# Patient Record
Sex: MALE | Race: WHITE | NOT HISPANIC OR LATINO | Employment: UNEMPLOYED | URBAN - METROPOLITAN AREA
[De-identification: names, ages, dates, MRNs, and addresses within clinical notes are randomized per-mention and may not be internally consistent; named-entity substitution may affect disease eponyms.]

---

## 2023-02-26 ENCOUNTER — HOSPITAL ENCOUNTER (EMERGENCY)
Facility: HOSPITAL | Age: 58
Discharge: PSYCHIATRIC OR SUBSTANCE USE FACILITY | End: 2023-02-26
Attending: EMERGENCY MEDICINE
Payer: MEDICARE

## 2023-02-26 VITALS
SYSTOLIC BLOOD PRESSURE: 148 MMHG | HEIGHT: 70 IN | BODY MASS INDEX: 25.22 KG/M2 | DIASTOLIC BLOOD PRESSURE: 88 MMHG | TEMPERATURE: 98 F | OXYGEN SATURATION: 98 % | RESPIRATION RATE: 17 BRPM | WEIGHT: 176.19 LBS | HEART RATE: 89 BPM

## 2023-02-26 DIAGNOSIS — S82.044G: ICD-10-CM

## 2023-02-26 DIAGNOSIS — F10.10 ETOH ABUSE: ICD-10-CM

## 2023-02-26 DIAGNOSIS — R45.851 DEPRESSION WITH SUICIDAL IDEATION: Primary | ICD-10-CM

## 2023-02-26 DIAGNOSIS — Z00.8 MEDICAL CLEARANCE FOR PSYCHIATRIC ADMISSION: ICD-10-CM

## 2023-02-26 DIAGNOSIS — F32.A DEPRESSION WITH SUICIDAL IDEATION: Primary | ICD-10-CM

## 2023-02-26 DIAGNOSIS — M25.561 RIGHT KNEE PAIN: ICD-10-CM

## 2023-02-26 LAB
ALBUMIN SERPL BCP-MCNC: 4 G/DL (ref 3.5–5.2)
ALP SERPL-CCNC: 118 U/L (ref 55–135)
ALT SERPL W/O P-5'-P-CCNC: 120 U/L (ref 10–44)
AMPHET+METHAMPHET UR QL: NEGATIVE
ANION GAP SERPL CALC-SCNC: 18 MMOL/L (ref 8–16)
AST SERPL-CCNC: 173 U/L (ref 10–40)
BACTERIA #/AREA URNS HPF: ABNORMAL /HPF
BARBITURATES UR QL SCN>200 NG/ML: NEGATIVE
BASOPHILS # BLD AUTO: 0.05 K/UL (ref 0–0.2)
BASOPHILS NFR BLD: 0.7 % (ref 0–1.9)
BENZODIAZ UR QL SCN>200 NG/ML: NEGATIVE
BILIRUB SERPL-MCNC: 0.9 MG/DL (ref 0.1–1)
BILIRUB UR QL STRIP: NEGATIVE
BUN SERPL-MCNC: 12 MG/DL (ref 6–20)
BZE UR QL SCN: ABNORMAL
CALCIUM SERPL-MCNC: 10.1 MG/DL (ref 8.7–10.5)
CANNABINOIDS UR QL SCN: NEGATIVE
CHLORIDE SERPL-SCNC: 102 MMOL/L (ref 95–110)
CLARITY UR: CLEAR
CO2 SERPL-SCNC: 21 MMOL/L (ref 23–29)
COLOR UR: YELLOW
CREAT SERPL-MCNC: 0.7 MG/DL (ref 0.5–1.4)
CREAT UR-MCNC: 66.8 MG/DL (ref 23–375)
DIFFERENTIAL METHOD: ABNORMAL
EOSINOPHIL # BLD AUTO: 0 K/UL (ref 0–0.5)
EOSINOPHIL NFR BLD: 0.3 % (ref 0–8)
ERYTHROCYTE [DISTWIDTH] IN BLOOD BY AUTOMATED COUNT: 14.6 % (ref 11.5–14.5)
EST. GFR  (NO RACE VARIABLE): >60 ML/MIN/1.73 M^2
ETHANOL SERPL-MCNC: 107 MG/DL
ETHANOL SERPL-MCNC: 202 MG/DL
ETHANOL SERPL-MCNC: 73 MG/DL
GLUCOSE SERPL-MCNC: 95 MG/DL (ref 70–110)
GLUCOSE UR QL STRIP: NEGATIVE
HCT VFR BLD AUTO: 41.2 % (ref 40–54)
HGB BLD-MCNC: 13.9 G/DL (ref 14–18)
HGB UR QL STRIP: ABNORMAL
IMM GRANULOCYTES # BLD AUTO: 0.01 K/UL (ref 0–0.04)
IMM GRANULOCYTES NFR BLD AUTO: 0.1 % (ref 0–0.5)
KETONES UR QL STRIP: ABNORMAL
LEUKOCYTE ESTERASE UR QL STRIP: NEGATIVE
LYMPHOCYTES # BLD AUTO: 2.6 K/UL (ref 1–4.8)
LYMPHOCYTES NFR BLD: 36.5 % (ref 18–48)
MCH RBC QN AUTO: 33.4 PG (ref 27–31)
MCHC RBC AUTO-ENTMCNC: 33.7 G/DL (ref 32–36)
MCV RBC AUTO: 99 FL (ref 82–98)
METHADONE UR QL SCN>300 NG/ML: NEGATIVE
MICROSCOPIC COMMENT: ABNORMAL
MONOCYTES # BLD AUTO: 0.5 K/UL (ref 0.3–1)
MONOCYTES NFR BLD: 7.5 % (ref 4–15)
NEUTROPHILS # BLD AUTO: 4 K/UL (ref 1.8–7.7)
NEUTROPHILS NFR BLD: 54.9 % (ref 38–73)
NITRITE UR QL STRIP: NEGATIVE
NRBC BLD-RTO: 0 /100 WBC
OPIATES UR QL SCN: NEGATIVE
PCP UR QL SCN>25 NG/ML: NEGATIVE
PH UR STRIP: 6 [PH] (ref 5–8)
PLATELET # BLD AUTO: 238 K/UL (ref 150–450)
PMV BLD AUTO: 9.1 FL (ref 9.2–12.9)
POTASSIUM SERPL-SCNC: 3.6 MMOL/L (ref 3.5–5.1)
PROT SERPL-MCNC: 7.7 G/DL (ref 6–8.4)
PROT UR QL STRIP: ABNORMAL
RBC # BLD AUTO: 4.16 M/UL (ref 4.6–6.2)
RBC #/AREA URNS HPF: 23 /HPF (ref 0–4)
SARS-COV-2 RDRP RESP QL NAA+PROBE: NEGATIVE
SODIUM SERPL-SCNC: 141 MMOL/L (ref 136–145)
SP GR UR STRIP: 1.01 (ref 1–1.03)
TOXICOLOGY INFORMATION: ABNORMAL
TSH SERPL DL<=0.005 MIU/L-ACNC: 0.6 UIU/ML (ref 0.4–4)
URN SPEC COLLECT METH UR: ABNORMAL
UROBILINOGEN UR STRIP-ACNC: NEGATIVE EU/DL
WBC # BLD AUTO: 7.2 K/UL (ref 3.9–12.7)

## 2023-02-26 PROCEDURE — 82077 ASSAY SPEC XCP UR&BREATH IA: CPT | Performed by: EMERGENCY MEDICINE

## 2023-02-26 PROCEDURE — 81000 URINALYSIS NONAUTO W/SCOPE: CPT | Mod: 59 | Performed by: EMERGENCY MEDICINE

## 2023-02-26 PROCEDURE — 84443 ASSAY THYROID STIM HORMONE: CPT | Performed by: EMERGENCY MEDICINE

## 2023-02-26 PROCEDURE — U0002 COVID-19 LAB TEST NON-CDC: HCPCS | Performed by: EMERGENCY MEDICINE

## 2023-02-26 PROCEDURE — G0425 INPT/ED TELECONSULT30: HCPCS | Mod: 95,,, | Performed by: PSYCHIATRY & NEUROLOGY

## 2023-02-26 PROCEDURE — 80307 DRUG TEST PRSMV CHEM ANLYZR: CPT | Performed by: EMERGENCY MEDICINE

## 2023-02-26 PROCEDURE — 25000003 PHARM REV CODE 250: Performed by: EMERGENCY MEDICINE

## 2023-02-26 PROCEDURE — 80053 COMPREHEN METABOLIC PANEL: CPT | Performed by: EMERGENCY MEDICINE

## 2023-02-26 PROCEDURE — G0425 PR INPT TELEHEALTH CONSULT 30M: ICD-10-PCS | Mod: 95,,, | Performed by: PSYCHIATRY & NEUROLOGY

## 2023-02-26 PROCEDURE — 85025 COMPLETE CBC W/AUTO DIFF WBC: CPT | Performed by: EMERGENCY MEDICINE

## 2023-02-26 PROCEDURE — 99285 EMERGENCY DEPT VISIT HI MDM: CPT

## 2023-02-26 RX ORDER — SUCRALFATE 1 G/10ML
1 SUSPENSION ORAL
Status: COMPLETED | OUTPATIENT
Start: 2023-02-26 | End: 2023-02-26

## 2023-02-26 RX ORDER — SUCRALFATE 1 G/10ML
1 SUSPENSION ORAL
Status: DISCONTINUED | OUTPATIENT
Start: 2023-02-26 | End: 2023-02-27 | Stop reason: HOSPADM

## 2023-02-26 RX ORDER — LORAZEPAM 1 MG/1
1 TABLET ORAL
Status: COMPLETED | OUTPATIENT
Start: 2023-02-26 | End: 2023-02-26

## 2023-02-26 RX ORDER — LORAZEPAM 1 MG/1
1 TABLET ORAL EVERY 4 HOURS PRN
Status: DISCONTINUED | OUTPATIENT
Start: 2023-02-26 | End: 2023-02-27 | Stop reason: HOSPADM

## 2023-02-26 RX ORDER — THIAMINE HCL 100 MG
100 TABLET ORAL DAILY
Status: DISCONTINUED | OUTPATIENT
Start: 2023-02-26 | End: 2023-02-27 | Stop reason: HOSPADM

## 2023-02-26 RX ADMIN — LORAZEPAM 1 MG: 1 TABLET ORAL at 07:02

## 2023-02-26 RX ADMIN — LORAZEPAM 1 MG: 1 TABLET ORAL at 01:02

## 2023-02-26 RX ADMIN — THIAMINE HCL TAB 100 MG 100 MG: 100 TAB at 04:02

## 2023-02-26 RX ADMIN — SUCRALFATE 1 G: 1 SUSPENSION ORAL at 08:02

## 2023-02-26 RX ADMIN — SUCRALFATE 1 G: 1 SUSPENSION ORAL at 01:02

## 2023-02-26 NOTE — ED NOTES
Patient Belongings  1.) Shoes  2.) Shirt  3.) Pants  4.) Hat  5.) Knee Brace  6.) Sweat shirt  7.) Lighter  8.) Grizzly   9.) Wallet  10.) Coins   Patient belongings have been searched and locked in locker #28

## 2023-02-26 NOTE — ED PROVIDER NOTES
SCRIBE #1 NOTE: I, Ramana Celaya and London Dwyer, am scribing for, and in the presence of, Angelina Mcneil MD. I have scribed the entire note.       History     Chief Complaint   Patient presents with    Suicidal     Patient present to ER stating he wants to kill himself. EMS was called due to patient attempting to cut his wrist outside of a store.      Review of patient's allergies indicates:  No Known Allergies      History of Present Illness     HPI    2/26/2023, 11:11 AM  History obtained from the patient and EMS      History of Present Illness: Michael Hernandez is a 57 y.o. male patient with a PMHx of EtOH abuse and mental illness who presents to the Emergency Department for psychiatric evaluation which onset PTA. Per EMS, the pt was found outside of a convenience store cutting wrist; 911 was called. Pt is actively suicidal, saying he does not want to live anymore. Symptoms are constant and moderate in severity. No mitigating or exacerbating factors reported. No additional associated sxs. Patient denies any SOB, CP, abd pain, weakness, and all other sxs at this time. No Prior Tx. Pt's last drink of alcohol was this am. Pt has a hx of withdrawals and DT. Pt injured right patella a few weeks ago, was seen at [facility] and diagnosed with patella fx, arrived with knee immobilizer in place. No further complaints or concerns at this time.       Arrival mode: EMS    PCP: No primary care provider on file.        Past Medical History:  No past medical history on file.    Past Surgical History:  No past surgical history on file.      Family History:  No family history on file.    Social History:  Social History     Tobacco Use    Smoking status: Not on file    Smokeless tobacco: Not on file   Substance and Sexual Activity    Alcohol use: Not on file    Drug use: Not on file    Sexual activity: Not on file        Review of Systems     Review of Systems   Constitutional:  Negative for chills and fever.    HENT:  Negative for congestion and sore throat.    Respiratory:  Negative for cough and shortness of breath.    Cardiovascular:  Negative for chest pain.   Gastrointestinal:  Negative for abdominal pain, constipation, nausea and vomiting.   Genitourinary:  Negative for dysuria.   Musculoskeletal:  Negative for back pain.   Skin:  Negative for rash.   Neurological:  Negative for weakness.   Hematological:  Does not bruise/bleed easily.   Psychiatric/Behavioral:  Positive for self-injury and suicidal ideas.    All other systems reviewed and are negative.     Physical Exam     Initial Vitals [02/26/23 1103]   BP Pulse Resp Temp SpO2   (!) 154/94 91 15 97.8 °F (36.6 °C) 98 %      MAP       --          Physical Exam  Nursing Notes and Vital Signs Reviewed.  Constitutional: Patient is in no acute distress. Well-developed and well-nourished.  Head: Atraumatic. Normocephalic.  Eyes: PERRL. EOM intact. Conjunctivae are not pale. No scleral icterus.  ENT: Mucous membranes are moist. Oropharynx is clear and symmetric.    Neck: Supple. Full ROM. No lymphadenopathy.  Cardiovascular: Regular rate. Regular rhythm. No murmurs, rubs, or gallops. Distal pulses are 2+ and symmetric.  Pulmonary/Chest: No respiratory distress. Clear to auscultation bilaterally. No wheezing or rales.  Abdominal: Soft and non-distended.  There is no tenderness.  No rebound, guarding, or rigidity. Good bowel sounds.  Genitourinary: No CVA tenderness  Musculoskeletal: Moves all extremities. No edema. Wearing knee immobilizer. Amputated fingers on L hand; 2nd and 3rd.   Skin: Warm and dry. Superficial cut marks to vulvar aspect of L wrist.  Neurological:  Alert, awake, and appropriate.  Normal speech.  No acute focal neurological deficits are appreciated.  Psychiatric: Appears depressed. Flat affect. Poor eye contact. Appropriate in content. +SI and self-injury.      ED Course   Procedures  ED Vital Signs:  Vitals:    02/26/23 1103   BP: (!) 154/94    Pulse: 91   Resp: 15   Temp: 97.8 °F (36.6 °C)   TempSrc: Oral   SpO2: 98%       Abnormal Lab Results:  Labs Reviewed   CBC W/ AUTO DIFFERENTIAL - Abnormal; Notable for the following components:       Result Value    RBC 4.16 (*)     Hemoglobin 13.9 (*)     MCV 99 (*)     MCH 33.4 (*)     RDW 14.6 (*)     MPV 9.1 (*)     All other components within normal limits   COMPREHENSIVE METABOLIC PANEL - Abnormal; Notable for the following components:    CO2 21 (*)      (*)      (*)     Anion Gap 18 (*)     All other components within normal limits   URINALYSIS, REFLEX TO URINE CULTURE - Abnormal; Notable for the following components:    Protein, UA Trace (*)     Ketones, UA 1+ (*)     Occult Blood UA 1+ (*)     All other components within normal limits    Narrative:     Specimen Source->Urine   DRUG SCREEN PANEL, URINE EMERGENCY - Abnormal; Notable for the following components:    Cocaine (Metab.) Presumptive Positive (*)     All other components within normal limits    Narrative:     Specimen Source->Urine   ALCOHOL,MEDICAL (ETHANOL) - Abnormal; Notable for the following components:    Alcohol, Serum 202 (*)     All other components within normal limits   URINALYSIS MICROSCOPIC - Abnormal; Notable for the following components:    RBC, UA 23 (*)     All other components within normal limits    Narrative:     Specimen Source->Urine   ALCOHOL,MEDICAL (ETHANOL) - Abnormal; Notable for the following components:    Alcohol, Serum 107 (*)     All other components within normal limits   TSH   SARS-COV-2 RNA AMPLIFICATION, QUAL   ALCOHOL,MEDICAL (ETHANOL)        All Lab Results:  Results for orders placed or performed during the hospital encounter of 02/26/23   CBC auto differential   Result Value Ref Range    WBC 7.20 3.90 - 12.70 K/uL    RBC 4.16 (L) 4.60 - 6.20 M/uL    Hemoglobin 13.9 (L) 14.0 - 18.0 g/dL    Hematocrit 41.2 40.0 - 54.0 %    MCV 99 (H) 82 - 98 fL    MCH 33.4 (H) 27.0 - 31.0 pg    MCHC 33.7 32.0 -  36.0 g/dL    RDW 14.6 (H) 11.5 - 14.5 %    Platelets 238 150 - 450 K/uL    MPV 9.1 (L) 9.2 - 12.9 fL    Immature Granulocytes 0.1 0.0 - 0.5 %    Gran # (ANC) 4.0 1.8 - 7.7 K/uL    Immature Grans (Abs) 0.01 0.00 - 0.04 K/uL    Lymph # 2.6 1.0 - 4.8 K/uL    Mono # 0.5 0.3 - 1.0 K/uL    Eos # 0.0 0.0 - 0.5 K/uL    Baso # 0.05 0.00 - 0.20 K/uL    nRBC 0 0 /100 WBC    Gran % 54.9 38.0 - 73.0 %    Lymph % 36.5 18.0 - 48.0 %    Mono % 7.5 4.0 - 15.0 %    Eosinophil % 0.3 0.0 - 8.0 %    Basophil % 0.7 0.0 - 1.9 %    Differential Method Automated    Comprehensive metabolic panel   Result Value Ref Range    Sodium 141 136 - 145 mmol/L    Potassium 3.6 3.5 - 5.1 mmol/L    Chloride 102 95 - 110 mmol/L    CO2 21 (L) 23 - 29 mmol/L    Glucose 95 70 - 110 mg/dL    BUN 12 6 - 20 mg/dL    Creatinine 0.7 0.5 - 1.4 mg/dL    Calcium 10.1 8.7 - 10.5 mg/dL    Total Protein 7.7 6.0 - 8.4 g/dL    Albumin 4.0 3.5 - 5.2 g/dL    Total Bilirubin 0.9 0.1 - 1.0 mg/dL    Alkaline Phosphatase 118 55 - 135 U/L     (H) 10 - 40 U/L     (H) 10 - 44 U/L    Anion Gap 18 (H) 8 - 16 mmol/L    eGFR >60 >60 mL/min/1.73 m^2   TSH   Result Value Ref Range    TSH 0.600 0.400 - 4.000 uIU/mL   Urinalysis, Reflex to Urine Culture Urine, Clean Catch    Specimen: Urine   Result Value Ref Range    Specimen UA Urine, Clean Catch     Color, UA Yellow Yellow, Straw, Zee    Appearance, UA Clear Clear    pH, UA 6.0 5.0 - 8.0    Specific Gravity, UA 1.010 1.005 - 1.030    Protein, UA Trace (A) Negative    Glucose, UA Negative Negative    Ketones, UA 1+ (A) Negative    Bilirubin (UA) Negative Negative    Occult Blood UA 1+ (A) Negative    Nitrite, UA Negative Negative    Urobilinogen, UA Negative <2.0 EU/dL    Leukocytes, UA Negative Negative   Drug screen panel, emergency   Result Value Ref Range    Benzodiazepines Negative Negative    Methadone metabolites Negative Negative    Cocaine (Metab.) Presumptive Positive (A) Negative    Opiate Scrn, Ur Negative  Negative    Barbiturate Screen, Ur Negative Negative    Amphetamine Screen, Ur Negative Negative    THC Negative Negative    Phencyclidine Negative Negative    Creatinine, Urine 66.8 23.0 - 375.0 mg/dL    Toxicology Information SEE COMMENT    Ethanol   Result Value Ref Range    Alcohol, Serum 202 (H) <10 mg/dL   COVID-19 Rapid Screening   Result Value Ref Range    SARS-CoV-2 RNA, Amplification, Qual Negative Negative   Urinalysis Microscopic   Result Value Ref Range    RBC, UA 23 (H) 0 - 4 /hpf    Bacteria Rare None-Occ /hpf    Microscopic Comment SEE COMMENT    Ethanol   Result Value Ref Range    Alcohol, Serum 107 (H) <10 mg/dL        Imaging Results:  Imaging Results              X-Ray Knee Complete 4 Or More Views Right (Final result)  Result time 02/26/23 11:31:44      Final result by YOBANI Martinez Sr., MD (02/26/23 11:31:44)                   Impression:      1. There are complex fractures of the patella.  2. There is a moderate sized joint effusion in the knee.      Electronically signed by: Vasiliy Martinez MD  Date:    02/26/2023  Time:    11:31               Narrative:    EXAMINATION:  XR KNEE COMP 4 OR MORE VIEWS RIGHT    CLINICAL HISTORY:  Pain in right knee    COMPARISON:  None    FINDINGS:  There are complex fractures of the patella.  There is a moderate sized joint effusion in the knee.  There is no dislocation.                                                The Emergency Provider reviewed the vital signs and test results, which are outlined above.     ED Discussion     11:17 AM: The PEC hold has been issued by Dr. Mcneil at this time for 11:17.     5:38 PM: Pt has been medically cleared by Dr. Mcneil at this time. Reassessed pt at this time. Pt is resting comfortably and appears in no acute distress. There are no psychiatric services offered at this facility. D/w pt all pertinent ED information and plan to transfer to psychiatric facility for psychiatric treatment. Pt verbalizes understanding.  [facility]. Pt has been made aware of all risks and benefits associated with transfer, including but not limited to death, MVC, loss of vital signs, and/or permanent disability. Benefits include ability to be treated at an inpatient psychiatric facility. Pt will be transported by personnel trained in CPR and CPI. Patient understands that there could be unforeseen motor vehicle accidents, inclement weather, or loss of vital signs that could result in potential death or permanent disability. All questions and complaints have been addressed at this time. Pt condition is stable at this time and is clear to transfer to psychiatric facility at this time.         Medical Decision Making:   Clinical Tests:   Lab Tests: Ordered and Reviewed  Radiological Study: Ordered and Reviewed  ED Management:  Patient presents with severe exacerbation of chronic depression, actively suicidal, not on medication, hx of etoh abuse with last drink today, lab work reviewed, patient also cocaine positive, ETOH initially over 200 and now 107, xray reviewed and shows comminuted patella fx patient to keep knee immobilizer in place, he is medically cleared now for inpatient psych placement.           ED Medication(s):  Medications   thiamine tablet 100 mg (100 mg Oral Given 2/26/23 1632)   sucralfate 100 mg/mL suspension 1 g (1 g Oral Given 2/26/23 1307)   LORazepam tablet 1 mg (1 mg Oral Given 2/26/23 1306)       New Prescriptions    No medications on file               Scribe Attestation:   Scribe #1: I performed the above scribed service and the documentation accurately describes the services I performed. I attest to the accuracy of the note.     Attending:   Physician Attestation Statement for Scribe #1: I, Angelina Mcneil MD, personally performed the services described in this documentation, as scribed by Ramana Celaya and London Dwyer, in my presence, and it is both  accurate and complete.           Clinical Impression       ICD-10-CM ICD-9-CM   1. Depression with suicidal ideation  F32.A 311    R45.851 V62.84   2. Right knee pain  M25.561 719.46   3. Medical clearance for psychiatric admission  Z00.8 V70.8   4. ETOH abuse  F10.10 305.00   5. Closed nondisplaced comminuted fracture of right patella with delayed healing, subsequent encounter  S82.044G V54.16       Disposition:   Disposition: Transferred  Condition: Fair      Angelina Mcneil MD  02/26/23 8250

## 2023-02-26 NOTE — CONSULTS
"[facility]  Psychiatry  Telepsychiatry Consult Note    Please see previous notes:    Patient agreeable to consultation via telepsychiatry.    Tele-Consultation from Psychiatry started: 2/26/2023 at 2:02pm  The chief complaint leading to psychiatric consultation is: SI, etoh use  This consultation was requested by Dr Mcneil, the Emergency Department attending physician.  The location of the consulting psychiatrist is  [place] .  The patient location is  [facility] EMERGENCY DEPARTMENT   The patient arrived at the ED at: [facility]    Also present with the patient at the time of the consultation: nobody    Patient Identification:   Michael Hernandez is a 57 y.o. male.    Patient information was obtained from patient and past medical records.  Patient presented to the Emergency Department     Consults  Teleconsult Time Documentation  Subjective:     History of Present Illness:  56yo male with hx of depression and alcohol use disorder brought in after cutting his wrist. BAL was 202. UDS was positive for cicaine    Per ED staff-"                History of Present Illness: Michael Hernandez is a 57 y.o. male patient with a PMHx of EtOH abuse and mental illness who presents to the Emergency Department for psychiatric evaluation which onset PTA. Per EMS, the pt was found outside of a convenience store cutting wrist; 911 was called. Pt is actively suicidal, saying he does not want to live anymore. Symptoms are constant and moderate in severity. No mitigating or exacerbating factors reported. No additional associated sxs. Patient denies any SOB, CP, abd pain, weakness, and all other sxs at this time. No Prior Tx. Pt's last drink of alcohol was this am. Pt has a hx of withdrawals and DT. Pt injured patella a few weeks ago. No further complaints or concerns at this time. "    On interview, patient was difficult to understand, speech was garbled. Patient reports he has been off his pristiq and seroquel for depression for the past few " "weeks. Reports he could not find a psychiatrist that his insurance would pay for. Patient reports he cut his wrist with a beer can because he could not find anything sharper.  Reports a woman inside the convenience store called EMS. He reports ongoing depressed mood, anhedonia, trouble sleeping, feeling down on himself, SI. Reports he relapsed 2 weeks ago. Also hears voices that call his name.  Denied manic sx  Increased free floating anxiety  No reexperiencing sx  No aggression or agitation noted in the ED  No obsession noted  This is the extent of patients complaints at this time  12 pt ros was negative aside from sx noted above    Psychiatric History:   Previous Psychiatric Hospitalizations: Yes , most recent September 1st  Previous Medication Trials: Yes   Previous Suicide Attempts: yes , cutting, drank a bottle of antifreeze  History of Violence: denied  History of Depression: yes  History of Rae: no  History of Auditory/Visual Hallucination yes, AHs  History of Delusions: no  Outpatient psychiatrist (current & past): No    Substance Abuse History:  Tobacco:Yes, 1/2 ppd  Alcohol: "I drink until I pass out"  Illicit Substances:Yes, intermittent cocaine use  Detox/Rehab: Yes    Legal History: Past charges/incarcerations: none at present    Family Psychiatric History: denied      Social History:  Originally from [place], [place]. Moved to [place] one year ago. Lives in [facility]. On disability. No access to firearms    Psychiatric Mental Status Exam:  Arousal: alert  Sensorium/Orientation: oriented to grossly intact  Behavior/Cooperation: normal, cooperative   Speech: normal tone, normal rate, normal pitch, normal volume  Language: not tested  Mood: " depressed "   Affect: blunted  Thought Process: normal and logical  Thought Content:   Auditory hallucinations: NO  Visual hallucinations: NO  Paranoia: NO  Delusions:  NO  Suicidal ideation: YES:      Homicidal ideation: NO  Attention/Concentration:  intact  Memory: "    Recent:  Intact   Remote: Intact     Fund of Knowledge: Aware of current events   Abstract reasoning: similarities were abstract  Insight: has awareness of illness  Judgment: behavior is adequate to circumstances      Past Medical History: No past medical history on file.   Laboratory Data:   Labs Reviewed   CBC W/ AUTO DIFFERENTIAL - Abnormal; Notable for the following components:       Result Value    RBC 4.16 (*)     Hemoglobin 13.9 (*)     MCV 99 (*)     MCH 33.4 (*)     RDW 14.6 (*)     MPV 9.1 (*)     All other components within normal limits   COMPREHENSIVE METABOLIC PANEL - Abnormal; Notable for the following components:    CO2 21 (*)      (*)      (*)     Anion Gap 18 (*)     All other components within normal limits   URINALYSIS, REFLEX TO URINE CULTURE - Abnormal; Notable for the following components:    Protein, UA Trace (*)     Ketones, UA 1+ (*)     Occult Blood UA 1+ (*)     All other components within normal limits    Narrative:     Specimen Source->Urine   DRUG SCREEN PANEL, URINE EMERGENCY - Abnormal; Notable for the following components:    Cocaine (Metab.) Presumptive Positive (*)     All other components within normal limits    Narrative:     Specimen Source->Urine   ALCOHOL,MEDICAL (ETHANOL) - Abnormal; Notable for the following components:    Alcohol, Serum 202 (*)     All other components within normal limits   URINALYSIS MICROSCOPIC - Abnormal; Notable for the following components:    RBC, UA 23 (*)     All other components within normal limits    Narrative:     Specimen Source->Urine   TSH   SARS-COV-2 RNA AMPLIFICATION, QUAL   ALCOHOL,MEDICAL (ETHANOL)           Allergies:   Review of patient's allergies indicates:  No Known Allergies    Medications in ER:   Medications   sucralfate 100 mg/mL suspension 1 g (1 g Oral Given 2/26/23 1307)   LORazepam tablet 1 mg (1 mg Oral Given 2/26/23 1306)       Medications at home: reviewed with patient and in MAR    No new subjective &  objective note has been filed under this hospital service since the last note was generated.      Assessment - Diagnosis - Goals:     Diagnosis/Impression:   MDD-severe recurrent with psychotic features  Alcohol use disorder  Cocaine use disorder    Rec:   Recommend PEC given risk of harm to self. Inpatient psychiatric tx once medically cleared.  Ativan 2mg IV/IM q 4 hours prn severe agitation or alcohol withdrawal. Banana bag.  Will defer to inpatient psychiatric team to start/modify scheduled medications  Informed consent provided regarding medications    Time with patient, coordinating care: 23min    More than 50% of the time was spent counseling/coordinating care    Consulting clinician was informed of the encounter and consult note.    Consultation ended: 2/26/2023 at 2:28pm    Marcelino Ch MD  Psychiatry  [facility]

## 2023-11-10 ENCOUNTER — HOSPITAL ENCOUNTER (EMERGENCY)
Facility: HOSPITAL | Age: 58
Discharge: HOME | End: 2023-11-11
Attending: EMERGENCY MEDICINE
Payer: MEDICARE

## 2023-11-10 DIAGNOSIS — F10.930 ALCOHOL WITHDRAWAL SYNDROME WITHOUT COMPLICATION: Primary | ICD-10-CM

## 2023-11-10 DIAGNOSIS — R56.9 SEIZURE-LIKE ACTIVITY: ICD-10-CM

## 2023-11-10 DIAGNOSIS — G40.909 RECURRENT SEIZURES: ICD-10-CM

## 2023-11-10 DIAGNOSIS — R10.13 EPIGASTRIC PAIN: ICD-10-CM

## 2023-11-10 PROBLEM — F19.10 POLYSUBSTANCE ABUSE: Status: ACTIVE | Noted: 2023-07-23

## 2023-11-10 PROBLEM — F25.9 SCHIZOAFFECTIVE DISORDER: Status: ACTIVE | Noted: 2023-11-10

## 2023-11-10 LAB
ALBUMIN SERPL BCP-MCNC: 4.4 G/DL (ref 3.5–5.2)
ALP SERPL-CCNC: 97 U/L (ref 55–135)
ALT SERPL W/O P-5'-P-CCNC: 68 U/L (ref 10–44)
AMPHET+METHAMPHET UR QL: NEGATIVE
ANION GAP SERPL CALC-SCNC: 14 MMOL/L (ref 8–16)
APAP SERPL-MCNC: <3 UG/ML (ref 10–20)
AST SERPL-CCNC: 77 U/L (ref 10–40)
BARBITURATES UR QL SCN>200 NG/ML: NEGATIVE
BASOPHILS # BLD AUTO: 0.04 K/UL (ref 0–0.2)
BASOPHILS NFR BLD: 0.6 % (ref 0–1.9)
BENZODIAZ UR QL SCN>200 NG/ML: ABNORMAL
BILIRUB SERPL-MCNC: 0.7 MG/DL (ref 0.1–1)
BILIRUB UR QL STRIP: NEGATIVE
BUN SERPL-MCNC: 16 MG/DL (ref 6–20)
BZE UR QL SCN: NEGATIVE
CALCIUM SERPL-MCNC: 9.4 MG/DL (ref 8.7–10.5)
CANNABINOIDS UR QL SCN: NEGATIVE
CHLORIDE SERPL-SCNC: 105 MMOL/L (ref 95–110)
CLARITY UR: CLEAR
CO2 SERPL-SCNC: 24 MMOL/L (ref 23–29)
COLOR UR: YELLOW
CREAT SERPL-MCNC: 0.9 MG/DL (ref 0.5–1.4)
CREAT UR-MCNC: 179.7 MG/DL (ref 23–375)
DIFFERENTIAL METHOD: ABNORMAL
EOSINOPHIL # BLD AUTO: 0 K/UL (ref 0–0.5)
EOSINOPHIL NFR BLD: 0.5 % (ref 0–8)
ERYTHROCYTE [DISTWIDTH] IN BLOOD BY AUTOMATED COUNT: 12.8 % (ref 11.5–14.5)
EST. GFR  (NO RACE VARIABLE): >60 ML/MIN/1.73 M^2
ETHANOL SERPL-MCNC: <10 MG/DL
GLUCOSE SERPL-MCNC: 105 MG/DL (ref 70–110)
GLUCOSE UR QL STRIP: NEGATIVE
HCT VFR BLD AUTO: 38.8 % (ref 40–54)
HGB BLD-MCNC: 13.6 G/DL (ref 14–18)
HGB UR QL STRIP: NEGATIVE
IMM GRANULOCYTES # BLD AUTO: 0.01 K/UL (ref 0–0.04)
IMM GRANULOCYTES NFR BLD AUTO: 0.2 % (ref 0–0.5)
KETONES UR QL STRIP: ABNORMAL
LEUKOCYTE ESTERASE UR QL STRIP: NEGATIVE
LIPASE SERPL-CCNC: 40 U/L (ref 4–60)
LYMPHOCYTES # BLD AUTO: 1.2 K/UL (ref 1–4.8)
LYMPHOCYTES NFR BLD: 18.7 % (ref 18–48)
MAGNESIUM SERPL-MCNC: 1.6 MG/DL (ref 1.6–2.6)
MCH RBC QN AUTO: 34 PG (ref 27–31)
MCHC RBC AUTO-ENTMCNC: 35.1 G/DL (ref 32–36)
MCV RBC AUTO: 97 FL (ref 82–98)
METHADONE UR QL SCN>300 NG/ML: NEGATIVE
MONOCYTES # BLD AUTO: 0.6 K/UL (ref 0.3–1)
MONOCYTES NFR BLD: 9.5 % (ref 4–15)
NEUTROPHILS # BLD AUTO: 4.6 K/UL (ref 1.8–7.7)
NEUTROPHILS NFR BLD: 70.5 % (ref 38–73)
NITRITE UR QL STRIP: NEGATIVE
NRBC BLD-RTO: 0 /100 WBC
OPIATES UR QL SCN: NEGATIVE
PCP UR QL SCN>25 NG/ML: NEGATIVE
PH UR STRIP: 8 [PH] (ref 5–8)
PLATELET # BLD AUTO: 181 K/UL (ref 150–450)
PMV BLD AUTO: 9.7 FL (ref 9.2–12.9)
POTASSIUM SERPL-SCNC: 3.4 MMOL/L (ref 3.5–5.1)
PROT SERPL-MCNC: 7.8 G/DL (ref 6–8.4)
PROT UR QL STRIP: ABNORMAL
RBC # BLD AUTO: 4 M/UL (ref 4.6–6.2)
SALICYLATES SERPL-MCNC: <5 MG/DL (ref 15–30)
SODIUM SERPL-SCNC: 143 MMOL/L (ref 136–145)
SP GR UR STRIP: >1.03 (ref 1–1.03)
TOXICOLOGY INFORMATION: ABNORMAL
TROPONIN I SERPL DL<=0.01 NG/ML-MCNC: 0.01 NG/ML (ref 0–0.03)
TSH SERPL DL<=0.005 MIU/L-ACNC: 0.41 UIU/ML (ref 0.4–4)
URN SPEC COLLECT METH UR: ABNORMAL
UROBILINOGEN UR STRIP-ACNC: ABNORMAL EU/DL
WBC # BLD AUTO: 6.54 K/UL (ref 3.9–12.7)

## 2023-11-10 PROCEDURE — 96365 THER/PROPH/DIAG IV INF INIT: CPT

## 2023-11-10 PROCEDURE — 80307 DRUG TEST PRSMV CHEM ANLYZR: CPT | Performed by: EMERGENCY MEDICINE

## 2023-11-10 PROCEDURE — 63600175 PHARM REV CODE 636 W HCPCS: Performed by: EMERGENCY MEDICINE

## 2023-11-10 PROCEDURE — 93010 EKG 12-LEAD: ICD-10-PCS | Mod: ,,, | Performed by: INTERNAL MEDICINE

## 2023-11-10 PROCEDURE — 84484 ASSAY OF TROPONIN QUANT: CPT | Performed by: EMERGENCY MEDICINE

## 2023-11-10 PROCEDURE — 84443 ASSAY THYROID STIM HORMONE: CPT | Performed by: EMERGENCY MEDICINE

## 2023-11-10 PROCEDURE — 25000003 PHARM REV CODE 250: Performed by: EMERGENCY MEDICINE

## 2023-11-10 PROCEDURE — 99285 EMERGENCY DEPT VISIT HI MDM: CPT | Mod: 25

## 2023-11-10 PROCEDURE — 93010 ELECTROCARDIOGRAM REPORT: CPT | Mod: ,,, | Performed by: INTERNAL MEDICINE

## 2023-11-10 PROCEDURE — 96375 TX/PRO/DX INJ NEW DRUG ADDON: CPT

## 2023-11-10 PROCEDURE — C9113 INJ PANTOPRAZOLE SODIUM, VIA: HCPCS | Performed by: EMERGENCY MEDICINE

## 2023-11-10 PROCEDURE — 83735 ASSAY OF MAGNESIUM: CPT | Performed by: EMERGENCY MEDICINE

## 2023-11-10 PROCEDURE — 85025 COMPLETE CBC W/AUTO DIFF WBC: CPT | Performed by: EMERGENCY MEDICINE

## 2023-11-10 PROCEDURE — 80143 DRUG ASSAY ACETAMINOPHEN: CPT | Performed by: EMERGENCY MEDICINE

## 2023-11-10 PROCEDURE — 82077 ASSAY SPEC XCP UR&BREATH IA: CPT | Performed by: EMERGENCY MEDICINE

## 2023-11-10 PROCEDURE — 93005 ELECTROCARDIOGRAM TRACING: CPT

## 2023-11-10 PROCEDURE — 80053 COMPREHEN METABOLIC PANEL: CPT | Performed by: EMERGENCY MEDICINE

## 2023-11-10 PROCEDURE — 96361 HYDRATE IV INFUSION ADD-ON: CPT

## 2023-11-10 PROCEDURE — 83690 ASSAY OF LIPASE: CPT | Performed by: EMERGENCY MEDICINE

## 2023-11-10 PROCEDURE — 81003 URINALYSIS AUTO W/O SCOPE: CPT | Mod: 59 | Performed by: EMERGENCY MEDICINE

## 2023-11-10 PROCEDURE — 80177 DRUG SCRN QUAN LEVETIRACETAM: CPT | Performed by: EMERGENCY MEDICINE

## 2023-11-10 PROCEDURE — 80179 DRUG ASSAY SALICYLATE: CPT | Performed by: EMERGENCY MEDICINE

## 2023-11-10 RX ORDER — LEVETIRACETAM 500 MG/1
500 TABLET ORAL 2 TIMES DAILY
Qty: 60 TABLET | Refills: 0 | Status: SHIPPED | OUTPATIENT
Start: 2023-11-10 | End: 2023-12-10

## 2023-11-10 RX ORDER — LORAZEPAM 2 MG/ML
2 INJECTION INTRAMUSCULAR
Status: COMPLETED | OUTPATIENT
Start: 2023-11-10 | End: 2023-11-10

## 2023-11-10 RX ORDER — IBUPROFEN 400 MG/1
400 TABLET ORAL
Status: COMPLETED | OUTPATIENT
Start: 2023-11-10 | End: 2023-11-10

## 2023-11-10 RX ORDER — CHLORDIAZEPOXIDE HYDROCHLORIDE 25 MG/1
25 CAPSULE, GELATIN COATED ORAL 4 TIMES DAILY PRN
Qty: 20 CAPSULE | Refills: 0 | Status: SHIPPED | OUTPATIENT
Start: 2023-11-10 | End: 2023-11-15

## 2023-11-10 RX ORDER — MAG HYDROX/ALUMINUM HYD/SIMETH 200-200-20
5 SUSPENSION, ORAL (FINAL DOSE FORM) ORAL
Status: COMPLETED | OUTPATIENT
Start: 2023-11-10 | End: 2023-11-10

## 2023-11-10 RX ORDER — LEVETIRACETAM 10 MG/ML
1000 INJECTION INTRAVASCULAR
Status: COMPLETED | OUTPATIENT
Start: 2023-11-10 | End: 2023-11-10

## 2023-11-10 RX ORDER — PANTOPRAZOLE SODIUM 40 MG/10ML
40 INJECTION, POWDER, LYOPHILIZED, FOR SOLUTION INTRAVENOUS DAILY
Status: COMPLETED | OUTPATIENT
Start: 2023-11-10 | End: 2023-11-10

## 2023-11-10 RX ADMIN — LORAZEPAM 2 MG: 2 INJECTION INTRAMUSCULAR; INTRAVENOUS at 05:11

## 2023-11-10 RX ADMIN — LEVETIRACETAM INJECTION 1000 MG: 10 INJECTION INTRAVENOUS at 05:11

## 2023-11-10 RX ADMIN — FOLIC ACID: 5 INJECTION, SOLUTION INTRAMUSCULAR; INTRAVENOUS; SUBCUTANEOUS at 06:11

## 2023-11-10 RX ADMIN — ALUMINUM HYDROXIDE, MAGNESIUM HYDROXIDE, AND DIMETHICONE 5 ML: 200; 20; 200 SUSPENSION ORAL at 08:11

## 2023-11-10 RX ADMIN — PANTOPRAZOLE SODIUM 40 MG: 40 INJECTION, POWDER, FOR SOLUTION INTRAVENOUS at 08:11

## 2023-11-10 RX ADMIN — IBUPROFEN 400 MG: 400 TABLET ORAL at 08:11

## 2023-11-10 NOTE — ED PROVIDER NOTES
Emergency Medicine Provider Note - 11/10/2023      SCRIBE #1 NOTE: I, Cassidy Adams, am scribing for, and in the presence of, Val Johnson DO. I have scribed the HPI, ROS and Pex.     SCRIBE #2 NOTE: I, Akilah Arreola, am scribing for, and in the presence of,  Raymond San Jr., MD. I have scribed the remaining portions of the note not scribed by Scribe #1.         History      Chief Complaint   Patient presents with    Seizures     Pt sent from [facility] for alcohol withdrawal, seizure on arrival to ED       Review of patient's allergies indicates:  No Known Allergies     HPI   HPI    11/10/2023, 4:54 PM   History obtained from the patient      History of Present Illness: Michael Hernandez is a 57 y.o. male patient who presents to the Emergency Department for evaluation after seizure. Pt reports he has a history of seizures and is supposed to take Keppra 1000mg per day but is noncompliant. Pt reports he normally drinks 4 pints of 80 proof whiskey per night and last drank last night.  Patient denies any neck pain, paresthesias, chest pain, chest pressure, shortness of breath, difficulty breathing.    HPI from EMS: Per [facility], pt checked into an alcohol rehabilitation center this morning after drinking 3.5 pints of whiskley last night. The seizure lasted about 1.5 minutes and pt hit his head. Pt was given 10mg versed en route to ED. Blood sugar was 116 PTA. Pt's EKG was normal with sinus tachycardia.     Arrival mode: [facility]    PCP: No, Primary Doctor     Past Medical History:  Positive for seizure disorder, alcohol abuse, schizoaffective disorder    Past Surgical History:  No past surgical history on file.      Family History:  No family history on file.    Social History:  Positive for alcohol abuse.      ROS   Review of Systems   Neurological:  Positive for seizures.     Physical Exam      Initial Vitals   BP Pulse Resp Temp SpO2   11/10/23 1631 11/10/23 1631 11/10/23 1631 11/10/23 1645 11/10/23  1631   130/65 (!) 118 20 98.4 °F (36.9 °C) 96 %      MAP       --                 Physical Exam  Nursing Notes and Vital Signs Reviewed.  Constitutional: Patient is in mild distress.  Ill-appearing.  Tremors are noted.  No diaphoresis.  Well-developed and well-nourished.  Head: Contusion to back of head.  Abrasion to left forehead.  Eyes:  Previous injury to right eye.  Left eye pupils reactive.  Conjunctivae are not pale. No scleral icterus. Previous injury to right eye.  ENT: Mucous membranes are moist. Oropharynx is clear and symmetric. No tongue swelling. No lip swelling.  No hemotympanum.  Neck: Supple. Full ROM. No lymphadenopathy.  No midline cervical neck tenderness.  Cardiovascular:  Tachycardic.  No murmurs, rubs, or gallops. Distal pulses are 2+ and symmetric.  Pulmonary/Chest: No respiratory distress. Clear to auscultation bilaterally. No wheezing or rales.  Abdominal: Soft and non-distended.  There is no tenderness.  No rebound, guarding, or rigidity.  Genitourinary: No CVA tenderness  Musculoskeletal: Moves all extremities. No obvious deformities. No edema. No calf tenderness.  Skin: Warm and dry.  Neurological:  Alert, awake, and appropriate.  Normal speech.  No acute focal neurological deficits are appreciated. Tremors.  Psychiatric: Normal affect. Good eye contact. Appropriate in content.    ED Course    Procedures  ED Vital Signs:  Vitals:    11/10/23 1631 11/10/23 1645 11/10/23 1830 11/10/23 2005   BP: 130/65 137/78 129/78 139/76   Pulse: (!) 118 76 70 62   Resp: 20 13 18 (!) 23   Temp:  98.4 °F (36.9 °C)     TempSrc:  Oral     SpO2: 96% 96% 99% 99%    11/10/23 2100 11/10/23 2118 11/10/23 2130 11/10/23 2230   BP: 134/68  134/69 131/72   Pulse: 74 71 75 70   Resp: (!) 23 20 (!) 21 20   Temp:       TempSrc:       SpO2: 96% 96% 96% 97%    11/10/23 2330 11/11/23 0000   BP: (!) 141/74    Pulse: (!) 55    Resp:     Temp:  99.1 °F (37.3 °C)   TempSrc:  Oral   SpO2: 95%        Abnormal Lab Results:  Labs  Reviewed   COMPREHENSIVE METABOLIC PANEL - Abnormal; Notable for the following components:       Result Value    Potassium 3.4 (*)     AST 77 (*)     ALT 68 (*)     All other components within normal limits   URINALYSIS, REFLEX TO URINE CULTURE - Abnormal; Notable for the following components:    Specific Gravity, UA >1.030 (*)     Protein, UA Trace (*)     Ketones, UA 2+ (*)     Urobilinogen, UA 2.0-3.0 (*)     All other components within normal limits    Narrative:     Specimen Source->Urine   DRUG SCREEN PANEL, URINE EMERGENCY - Abnormal; Notable for the following components:    Benzodiazepines Presumptive Positive (*)     All other components within normal limits    Narrative:     Specimen Source->Urine   ACETAMINOPHEN LEVEL - Abnormal; Notable for the following components:    Acetaminophen (Tylenol), Serum <3.0 (*)     All other components within normal limits   SALICYLATE LEVEL - Abnormal; Notable for the following components:    Salicylate Lvl <5.0 (*)     All other components within normal limits   CBC W/ AUTO DIFFERENTIAL - Abnormal; Notable for the following components:    RBC 4.00 (*)     Hemoglobin 13.6 (*)     Hematocrit 38.8 (*)     MCH 34.0 (*)     All other components within normal limits   TSH   ALCOHOL,MEDICAL (ETHANOL)   MAGNESIUM   LIPASE   TROPONIN I   LEVETIRACETAM  (KEPPRA) LEVEL        All Lab Results:  Results for orders placed or performed during the hospital encounter of 11/10/23   Comprehensive metabolic panel   Result Value Ref Range    Sodium 143 136 - 145 mmol/L    Potassium 3.4 (L) 3.5 - 5.1 mmol/L    Chloride 105 95 - 110 mmol/L    CO2 24 23 - 29 mmol/L    Glucose 105 70 - 110 mg/dL    BUN 16 6 - 20 mg/dL    Creatinine 0.9 0.5 - 1.4 mg/dL    Calcium 9.4 8.7 - 10.5 mg/dL    Total Protein 7.8 6.0 - 8.4 g/dL    Albumin 4.4 3.5 - 5.2 g/dL    Total Bilirubin 0.7 0.1 - 1.0 mg/dL    Alkaline Phosphatase 97 55 - 135 U/L    AST 77 (H) 10 - 40 U/L    ALT 68 (H) 10 - 44 U/L    eGFR >60 >60  mL/min/1.73 m^2    Anion Gap 14 8 - 16 mmol/L   TSH   Result Value Ref Range    TSH 0.410 0.400 - 4.000 uIU/mL   Urinalysis, Reflex to Urine Culture Urine, Clean Catch    Specimen: Urine   Result Value Ref Range    Specimen UA Urine, Clean Catch     Color, UA Yellow Yellow, Straw, Zee    Appearance, UA Clear Clear    pH, UA 8.0 5.0 - 8.0    Specific Gravity, UA >1.030 (A) 1.005 - 1.030    Protein, UA Trace (A) Negative    Glucose, UA Negative Negative    Ketones, UA 2+ (A) Negative    Bilirubin (UA) Negative Negative    Occult Blood UA Negative Negative    Nitrite, UA Negative Negative    Urobilinogen, UA 2.0-3.0 (A) <2.0 EU/dL    Leukocytes, UA Negative Negative   Drug screen panel, emergency   Result Value Ref Range    Benzodiazepines Presumptive Positive (A) Negative    Methadone metabolites Negative Negative    Cocaine (Metab.) Negative Negative    Opiate Scrn, Ur Negative Negative    Barbiturate Screen, Ur Negative Negative    Amphetamine Screen, Ur Negative Negative    THC Negative Negative    Phencyclidine Negative Negative    Creatinine, Urine 179.7 23.0 - 375.0 mg/dL    Toxicology Information SEE COMMENT    Ethanol   Result Value Ref Range    Alcohol, Serum <10 <10 mg/dL   Acetaminophen level   Result Value Ref Range    Acetaminophen (Tylenol), Serum <3.0 (L) 10.0 - 20.0 ug/mL   Salicylate level   Result Value Ref Range    Salicylate Lvl <5.0 (L) 15.0 - 30.0 mg/dL   Magnesium   Result Value Ref Range    Magnesium 1.6 1.6 - 2.6 mg/dL   CBC auto differential   Result Value Ref Range    WBC 6.54 3.90 - 12.70 K/uL    RBC 4.00 (L) 4.60 - 6.20 M/uL    Hemoglobin 13.6 (L) 14.0 - 18.0 g/dL    Hematocrit 38.8 (L) 40.0 - 54.0 %    MCV 97 82 - 98 fL    MCH 34.0 (H) 27.0 - 31.0 pg    MCHC 35.1 32.0 - 36.0 g/dL    RDW 12.8 11.5 - 14.5 %    Platelets 181 150 - 450 K/uL    MPV 9.7 9.2 - 12.9 fL    Immature Granulocytes 0.2 0.0 - 0.5 %    Gran # (ANC) 4.6 1.8 - 7.7 K/uL    Immature Grans (Abs) 0.01 0.00 - 0.04 K/uL     Lymph # 1.2 1.0 - 4.8 K/uL    Mono # 0.6 0.3 - 1.0 K/uL    Eos # 0.0 0.0 - 0.5 K/uL    Baso # 0.04 0.00 - 0.20 K/uL    nRBC 0 0 /100 WBC    Gran % 70.5 38.0 - 73.0 %    Lymph % 18.7 18.0 - 48.0 %    Mono % 9.5 4.0 - 15.0 %    Eosinophil % 0.5 0.0 - 8.0 %    Basophil % 0.6 0.0 - 1.9 %    Differential Method Automated    Lipase   Result Value Ref Range    Lipase 40 4 - 60 U/L   Troponin I   Result Value Ref Range    Troponin I 0.014 0.000 - 0.026 ng/mL         Imaging Results:  Imaging Results              CT Head Without Contrast (Final result)  Result time 11/10/23 19:07:52      Final result by Nikolai Aguillon MD (11/10/23 19:07:52)                   Impression:      Atrophy and chronic right matter changes.  Encephalomalacia in the right temporal and posterior occipital lobe.  Metallic artifacts.  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  No hemorrhage mass effect midline shift    All CT scans   are performed using dose optimization techniques including the following: automated exposure control; adjustment of the mA and/or kV; use of iterative reconstruction technique.  Dose modulation was employed for ALARA by means of: Automated exposure control; adjustment of the mA and/or kV according to patient size (this includes techniques or standardized protocols for targeted exams where dose is matched to indication/reason for exam; i.e. extremities or head); and/or use of iterative reconstructive technique.      Electronically signed by: Nikolai Aguillon  Date:    11/10/2023  Time:    19:07               Narrative:    EXAMINATION:  CT HEAD WITHOUT CONTRAST    CLINICAL HISTORY:  seizure; Unspecified convulsions    TECHNIQUE:  Low dose axial CT images obtained throughout the head without intravenous contrast. Sagittal and coronal reconstructions were performed.    COMPARISON:  None.    FINDINGS:  Mild motion artifacts.  Metallic fragment in the posterior skull with resulting metallic artifacts.   Encephalomalacia in the right occipital lobe.  Atrophy and chronic white matter changes.  Encephalomalacia involving the right temporal lobe.  Hyperdensity in the middle cranial fossa likely related to focal oval-shaped ossification of uncertain origin.  Right high prosthesis                                     The EKG was ordered, reviewed, and independently interpreted by the ED provider.  Interpretation time: 19:53  Rate: 63 BPM  Rhythm: normal sinus rhythm  Interpretation: No acute ST changes. No STEMI.             The Emergency Provider reviewed the vital signs and test results, which are outlined above.    ED Discussion     ED Course as of 11/11/23 0045   Fri Nov 10, 2023   1922 Benzodiazepines(!): Presumptive Positive [LB]   1922 Specific Gravity, UA(!): >1.030 [LB]   1922 Potassium(!): 3.4 [LB]   1922 Magnesium : 1.6 [LB]   1922 Hemoglobin(!): 13.6 [LB]   1922 Hematocrit(!): 38.8 [LB]   1947 Patient is complaining of epigastric discomfort.  Will get EKG.  No signs of alcohol withdrawal. [LB]      ED Course User Index  [LB] Val Johnson, DO     8:00 PM: Dr. Johnson transfers care of patient to Dr. San pending imaging results.    8:20 PM: Dr. San re-evaluated pt. Pt is resting comfortably and is in no acute distress. Dr. San agrees with Dr. Johnson's assessment.  D/w pt all pertinent results. D/w pt any concerns expressed at this time. Answered all questions. Pt expresses understanding at this time.     11:31 PM: Reassessed pt at this time.  Discussed with pt all pertinent ED information and results. Pt is exhibiting mild EtOH withdrawal symptoms. No advert signs of DTs. Denies SI/HI. Pt states he has not been taking his Keppra regularly. 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 No seizure activity while here in ER.  Dr. San has refilled the pt's Keppra, and has also prescribed Librium. Discussed pt dx and plan of tx. Gave pt all f/u and return to the  ED instructions. All questions and concerns were addressed at this time. Pt expresses understanding of information and instructions, and is comfortable with plan to discharge. Pt plans on returning to [facility] after discharge. Advised to follow up with PCP within a week, and to return to ED if any issues arise. Pt is stable for discharge.    I discussed with patient and/or family/caretaker that evaluation in the ED does not suggest any emergent or life threatening medical conditions requiring immediate intervention beyond what was provided in the ED, and I believe patient is safe for discharge.  Regardless, an unremarkable evaluation in the ED does not preclude the development or presence of a serious of life threatening condition. As such, patient was instructed to return immediately for any worsening or change in current symptoms.     SEIZURE PRECAUTION  [facility]- Department of Emergency Medicine has discussed and alerted you to the danger of driving, after being diagnosed with a Seizure or possible Seizures Disorder.  The situation of driving and Seizure Disorder is very dangerous for you as the patient, as well as others on the road. It was explained to you that seizure medication does not guarantee the full control of seizure episodes. Seizures can occur at any time and anywhere and in any situation.  Your Physician discussed with you Seizure Safety Precautions, and you were informed that patients with Seizure Disorders should avoid the following:  Unattended Swimming.  Working in the fireplace.  Climbing high ladders, steps, and trees.  Working with sharp cutting machines and tools, or any other potentially harmful activity.  You understand that the [facility] does not require the doctor to report Seizure Disorders to the Department of Motor Vehicles. However, you are aware that you, as a patient with a Seizure Disorder, are personally obligated to inform the doctor and the Motor  Vehicle Department if a situation arises.    Driving or other activities under influence of medications - Patient and/or family/caretaker was given a prescription for, or instructed to use a medicine that may impair ability to drive, operate machinery, or participate in other potentially dangerous activities.  Patient was instructed not to participate in these activities while under the influence of these medications.      ED Medication(s):  Medications   LORazepam injection 2 mg (2 mg Intravenous Given 11/10/23 1718)   sodium chloride 0.9% 1,000 mL with mvi, (ADULT) no.4 with vit K 3,300 unit- 150 mcg/10 mL 10 mL, thiamine 100 mg, folic acid 1 mg infusion ( Intravenous Stopped 11/10/23 4431)   levETIRAcetam in NaCl (iso-os) IVPB 1,000 mg (0 mg Intravenous Stopped 11/10/23 1821)   aluminum-magnesium hydroxide-simethicone 200-200-20 mg/5 mL suspension 5 mL (5 mLs Oral Given 11/10/23 2009)   pantoprazole injection 40 mg (40 mg Intravenous Given 11/10/23 2009)   ibuprofen tablet 400 mg (400 mg Oral Given 11/10/23 2009)            Medical Decision Making    Medical Decision Making  Amount and/or Complexity of Data Reviewed  Labs: ordered. Decision-making details documented in ED Course.  Radiology: ordered. Decision-making details documented in ED Course.  ECG/medicine tests: ordered. Decision-making details documented in ED Course.    Risk  OTC drugs.  Prescription drug management.                  Clinical Impression       ICD-10-CM ICD-9-CM   1. Alcohol withdrawal syndrome without complication  F10.930 291.81   2. Seizure-like activity  R56.9 780.39   3. Epigastric pain  R10.13 789.06   4. Recurrent seizures  G40.909 345.80       Disposition:   Disposition: Discharged  Condition: Stable       Follow-up Information       [facility]. Schedule an appointment as soon as possible for a visit in 1 week.    Contact information:  [address] [facility]  [place] [address] [phone]               [facility]  Internal Med [facility]. Schedule an appointment as soon as possible for a visit in 1 week.    Specialty: Internal Medicine  Contact information:  [address] [facility] [address] [phone]  Additional information:  Please park on the [address] side and use the Clinic entrance. Check in on the 2nd floor, to the right.             [facility] - Emergency Dept..    Specialty: Emergency Medicine  Why: As needed, If symptoms worsen  Contact information:  [address]  [facility] [address] [phone]                            Scribe Attestation:   Scribe #1: I performed the above scribed service and the documentation accurately describes the services I performed. I attest to the accuracy of the note.    Attending:   Physician Attestation Statement for Scribe #1: I, Val Johnson DO, personally performed the services described in this documentation, as scribed by Cassidy Adams, in my presence, and it is both accurate and complete.       Scribe Attestation:   Scribe #2: I performed the above scribed service and the documentation accurately describes the services I performed. I attest to the accuracy of the note.    Attending Attestation:           Physician Attestation for Scribe:    Physician Attestation Statement for Scribe #2: I, Raymond San Jr., MD, reviewed documentation, as scribed by Akilah Arreola in my presence, and it is both accurate and complete. I also acknowledge and confirm the content of the note done by Scribe #1.               Raymond San Jr., MD  11/11/23 0045

## 2023-11-11 VITALS
DIASTOLIC BLOOD PRESSURE: 74 MMHG | SYSTOLIC BLOOD PRESSURE: 141 MMHG | OXYGEN SATURATION: 95 % | HEART RATE: 55 BPM | RESPIRATION RATE: 20 BRPM | TEMPERATURE: 99 F

## 2023-11-11 NOTE — DISCHARGE INSTRUCTIONS
SEIZURE PRECAUTION  [facility]- Department of Emergency Medicine has discussed and alerted you to the danger of driving, after being diagnosed with a Seizure or possible Seizures Disorder.  The situation of driving and Seizure Disorder is very dangerous for you as the patient, as well as others on the road. It was explained to you that seizure medication does not guarantee the full control of seizure episodes. Seizures can occur at any time and anywhere and in any situation.  Your Physician discussed with you Seizure Safety Precautions, and you were informed that patients with Seizure Disorders should avoid the following:  Unattended Swimming.  Working in the fireplace.  Climbing high ladders, steps, and trees.  Working with sharp cutting machines and tools, or any other potentially harmful activity.  You understand that the [facility] does not require the doctor to report Seizure Disorders to the Department of Motor Vehicles. However, you are aware that you, as a patient with a Seizure Disorder, are personally obligated to inform the doctor and the Motor Vehicle Department if a situation arises.    Driving or other activities under influence of medications - Patient and/or family/caretaker was given a prescription for, or instructed to use a medicine that may impair ability to drive, operate machinery, or participate in other potentially dangerous activities.  Patient was instructed not to participate in these activities while under the influence of these medications.

## 2023-11-13 LAB — LEVETIRACETAM SERPL-MCNC: <1 UG/ML (ref 3–60)
